# Patient Record
Sex: FEMALE | Race: WHITE | NOT HISPANIC OR LATINO
[De-identification: names, ages, dates, MRNs, and addresses within clinical notes are randomized per-mention and may not be internally consistent; named-entity substitution may affect disease eponyms.]

---

## 2018-09-01 ENCOUNTER — TRANSCRIPTION ENCOUNTER (OUTPATIENT)
Age: 68
End: 2018-09-01

## 2018-09-04 ENCOUNTER — TRANSCRIPTION ENCOUNTER (OUTPATIENT)
Age: 68
End: 2018-09-04

## 2018-10-15 ENCOUNTER — TRANSCRIPTION ENCOUNTER (OUTPATIENT)
Age: 68
End: 2018-10-15

## 2022-06-04 ENCOUNTER — NON-APPOINTMENT (OUTPATIENT)
Age: 72
End: 2022-06-04

## 2022-08-31 ENCOUNTER — NON-APPOINTMENT (OUTPATIENT)
Age: 72
End: 2022-08-31

## 2022-09-05 ENCOUNTER — NON-APPOINTMENT (OUTPATIENT)
Age: 72
End: 2022-09-05

## 2022-10-13 ENCOUNTER — RX ONLY (RX ONLY)
Age: 72
End: 2022-10-13

## 2022-10-13 ENCOUNTER — OFFICE (OUTPATIENT)
Dept: URBAN - METROPOLITAN AREA CLINIC 38 | Facility: CLINIC | Age: 72
Setting detail: OPHTHALMOLOGY
End: 2022-10-13
Payer: MEDICARE

## 2022-10-13 DIAGNOSIS — H00.15: ICD-10-CM

## 2022-10-13 PROBLEM — H25.13 CATARACT; BOTH EYES: Status: ACTIVE | Noted: 2022-10-13

## 2022-10-13 PROCEDURE — 99203 OFFICE O/P NEW LOW 30 MIN: CPT | Performed by: OPHTHALMOLOGY

## 2022-10-13 ASSESSMENT — REFRACTION_CURRENTRX
OS_AXIS: 087
OS_CYLINDER: -0.50
OD_AXIS: 085
OD_CYLINDER: -0.50
OS_SPHERE: +1.50
OS_ADD: +2.50
OS_OVR_VA: 20/
OD_SPHERE: +1.75
OD_ADD: +2.25
OD_OVR_VA: 20/

## 2022-10-13 ASSESSMENT — REFRACTION_AUTOREFRACTION
OD_SPHERE: +1.75
OS_CYLINDER: -1.50
OD_AXIS: 089
OS_SPHERE: +2.00
OS_AXIS: 087
OD_CYLINDER: -1.25

## 2022-10-13 ASSESSMENT — KERATOMETRY
OS_K1POWER_DIOPTERS: 44.25
OD_K1POWER_DIOPTERS: 44.50
OD_K2POWER_DIOPTERS: 44.50
OD_AXISANGLE_DEGREES: 090
OS_AXISANGLE_DEGREES: 090
OS_K2POWER_DIOPTERS: 44.25

## 2022-10-13 ASSESSMENT — AXIALLENGTH_DERIVED
OS_AL: 22.8533
OD_AL: 22.8129

## 2022-10-13 ASSESSMENT — SPHEQUIV_DERIVED
OS_SPHEQUIV: 1.25
OD_SPHEQUIV: 1.125

## 2022-10-13 ASSESSMENT — VISUAL ACUITY
OD_BCVA: 20/40
OS_BCVA: 20/40

## 2022-10-13 ASSESSMENT — TONOMETRY
OD_IOP_MMHG: 16
OS_IOP_MMHG: 16

## 2022-10-13 ASSESSMENT — CONFRONTATIONAL VISUAL FIELD TEST (CVF)
OS_FINDINGS: FULL
OD_FINDINGS: FULL

## 2023-06-13 ENCOUNTER — APPOINTMENT (OUTPATIENT)
Dept: ORTHOPEDIC SURGERY | Facility: CLINIC | Age: 73
End: 2023-06-13
Payer: MEDICARE

## 2023-06-13 PROBLEM — Z00.00 ENCOUNTER FOR PREVENTIVE HEALTH EXAMINATION: Status: ACTIVE | Noted: 2023-06-13

## 2023-06-13 PROCEDURE — 64450 NJX AA&/STRD OTHER PN/BRANCH: CPT | Mod: RT

## 2023-06-13 PROCEDURE — 99203 OFFICE O/P NEW LOW 30 MIN: CPT | Mod: 25

## 2023-06-13 NOTE — REASON FOR VISIT
[FreeTextEntry2] : RT FOOT PAIN. PATIENT STATES SHARP PAIN WHILE SITTING DOWN COMES AND GO. STATES IT'S BEEN INTERRUPTING HER SLEEP. PATIENT BELIEVES IT'S NERVE RELATED. HAS SEEN A PODIATRIST IN [place]. PATIENT IS TAKING ADVIL PRN.

## 2023-06-13 NOTE — PHYSICAL EXAM
[Right] : right foot and ankle [5___] : [facility] 5[unfilled]/5 [2+] : posterior tibialis pulse: 2+ [] : non-antalgic [FreeTextEntry3] : NONE

## 2023-06-13 NOTE — ASSESSMENT
[FreeTextEntry1] : CELESTONE 6 MG APPLIED WITH LIDOCAINE PLAIN 10 MG  TO REDUCE PAIN AND SWELLING\par CELESTONE 2 UNITS, LIDOCAINE PLAIN 2 UNITS APPLIED TO MEDIAL FIRST RAY. \par NSAID OF CHOICE FOR PAIN.\par TYLENOL FOR PAIN\par COLD COMPRESSES.\par RTO PRN\par \par

## 2023-07-10 ENCOUNTER — APPOINTMENT (OUTPATIENT)
Dept: ORTHOPEDIC SURGERY | Facility: CLINIC | Age: 73
End: 2023-07-10
Payer: MEDICARE

## 2023-07-10 DIAGNOSIS — G89.29 OTHER CHRONIC PAIN: ICD-10-CM

## 2023-07-10 PROCEDURE — 99213 OFFICE O/P EST LOW 20 MIN: CPT

## 2023-07-10 PROCEDURE — 73630 X-RAY EXAM OF FOOT: CPT | Mod: RT

## 2023-07-10 NOTE — HISTORY OF PRESENT ILLNESS
[de-identified] : Patient is following up from injection in the RT foot. Claims the injection did not help. Pain is on and off. Claims the pain can be stemming from the back. Takes Advil and Tylenol as needed. Ices and heats during pain.

## 2023-07-10 NOTE — PHYSICAL EXAM
[1st] : 1st [NL 30)] : inversion 30 degrees [NL (40)] : MTP joint DF 40 degrees [NL (20)] : MTP joint PF 20 degrees [5___] : [facility] 5[unfilled]/5 [2+] : posterior tibialis pulse: 2+ [Normal] : saphenous nerve sensation normal [] : non-antalgic [Right] : right foot [There are no fractures, subluxations or dislocations. No significant abnormalities are seen] : There are no fractures, subluxations or dislocations. No significant abnormalities are seen [FreeTextEntry3] : none [de-identified] : no pathology noted. Need MRI right foot

## 2023-07-10 NOTE — REVIEW OF SYSTEMS
[Arthralgia] : arthralgia [Negative] : Heme/Lymph [FreeTextEntry9] : 1st met-cunieform joint medial right foot

## 2023-07-11 ENCOUNTER — FORM ENCOUNTER (OUTPATIENT)
Age: 73
End: 2023-07-11

## 2023-07-12 ENCOUNTER — APPOINTMENT (OUTPATIENT)
Dept: MRI IMAGING | Facility: CLINIC | Age: 73
End: 2023-07-12
Payer: MEDICARE

## 2023-07-12 PROCEDURE — 73718 MRI LOWER EXTREMITY W/O DYE: CPT | Mod: RT,MH

## 2023-07-17 ENCOUNTER — APPOINTMENT (OUTPATIENT)
Dept: ORTHOPEDIC SURGERY | Facility: CLINIC | Age: 73
End: 2023-07-17
Payer: MEDICARE

## 2023-07-17 DIAGNOSIS — S92.214D NONDISPLACED FRACTURE OF CUBOID BONE OF RIGHT FOOT, SUBSEQUENT ENCOUNTER FOR FRACTURE WITH ROUTINE HEALING: ICD-10-CM

## 2023-07-17 DIAGNOSIS — G57.91 UNSPECIFIED MONONEUROPATHY OF RIGHT LOWER LIMB: ICD-10-CM

## 2023-07-17 PROCEDURE — 99214 OFFICE O/P EST MOD 30 MIN: CPT

## 2023-07-17 NOTE — PHYSICAL EXAM
[Right] : right foot and ankle [2nd] : 2nd [NL (40)] : MTP joint DF 40 degrees [NL (20)] : MTP joint PF 20 degrees [] : no plantar metatarsal head tenderness

## 2023-07-17 NOTE — DATA REVIEWED
[MRI] : MRI [Right] : of the right [Foot] : foot [Report was reviewed and noted in the chart] : The report was reviewed and noted in the chart [I reviewed the films/CD and agree] : I reviewed the films/CD and agree [FreeTextEntry1] : NEUROMA 2ND IS , ARTHRITIS 1ST MPJ, CUBOID CYST WITH SUBCHONDRAL FRACTURE, NONDISPLACED.

## 2023-07-17 NOTE — ASSESSMENT
[FreeTextEntry1] : I DISCUSSED TREATMENT OPTIONS AS FOLLOWS:  I DISCUSSED FINDINGS WITH MY PATIENT AND DISCUSSED FURTHER TREATMENT IF NEEDED. \par THE FOLLOWING WAS SUGGESTED:\par NSAID OF CHOICE.\par TYLENOL.\par VOLTAREN GEL APPLY 4 TIMES PER DAY TO AFFECTED AREA. \par WARM OR COLD COMPRESSES AS TOLERATED.\par PT. WAS DISCUSSED AS WELL AS HOME EXERCISE PROGRAMS.\par ELEVATE AND APPLY WARM COMPRESSES.\par OTC INSERTS TO BE WORN FROM POWER STEP OR LIKE INSERTS. .\par STEROID INJECTION DISCUSSED\par RTO PRN